# Patient Record
Sex: MALE | Race: WHITE
[De-identification: names, ages, dates, MRNs, and addresses within clinical notes are randomized per-mention and may not be internally consistent; named-entity substitution may affect disease eponyms.]

---

## 2020-09-08 ENCOUNTER — HOSPITAL ENCOUNTER (OUTPATIENT)
Dept: HOSPITAL 62 - ER | Age: 45
Setting detail: OBSERVATION
LOS: 1 days | Discharge: HOME | End: 2020-09-09
Attending: INTERNAL MEDICINE | Admitting: INTERNAL MEDICINE
Payer: SELF-PAY

## 2020-09-08 DIAGNOSIS — F17.200: ICD-10-CM

## 2020-09-08 DIAGNOSIS — Z86.018: ICD-10-CM

## 2020-09-08 DIAGNOSIS — R05: ICD-10-CM

## 2020-09-08 DIAGNOSIS — I10: ICD-10-CM

## 2020-09-08 DIAGNOSIS — E78.5: ICD-10-CM

## 2020-09-08 DIAGNOSIS — R00.0: ICD-10-CM

## 2020-09-08 DIAGNOSIS — R06.09: ICD-10-CM

## 2020-09-08 DIAGNOSIS — R00.2: ICD-10-CM

## 2020-09-08 DIAGNOSIS — R53.1: ICD-10-CM

## 2020-09-08 DIAGNOSIS — R07.89: Primary | ICD-10-CM

## 2020-09-08 DIAGNOSIS — R42: ICD-10-CM

## 2020-09-08 DIAGNOSIS — Z82.49: ICD-10-CM

## 2020-09-08 LAB
ADD MANUAL DIFF: NO
ALBUMIN SERPL-MCNC: 4.3 G/DL (ref 3.5–5)
ALP SERPL-CCNC: 67 U/L (ref 38–126)
ANION GAP SERPL CALC-SCNC: 7 MMOL/L (ref 5–19)
AST SERPL-CCNC: 51 U/L (ref 17–59)
BASOPHILS # BLD AUTO: 0.1 10^3/UL (ref 0–0.2)
BASOPHILS NFR BLD AUTO: 1 % (ref 0–2)
BILIRUB DIRECT SERPL-MCNC: 0.4 MG/DL (ref 0–0.4)
BILIRUB SERPL-MCNC: 0.6 MG/DL (ref 0.2–1.3)
BUN SERPL-MCNC: 9 MG/DL (ref 7–20)
CALCIUM: 9.6 MG/DL (ref 8.4–10.2)
CHLORIDE SERPL-SCNC: 105 MMOL/L (ref 98–107)
CK MB SERPL-MCNC: 0.74 NG/ML (ref ?–4.55)
CK SERPL-CCNC: 103 U/L (ref 55–170)
CO2 SERPL-SCNC: 25 MMOL/L (ref 22–30)
EOSINOPHIL # BLD AUTO: 0.1 10^3/UL (ref 0–0.6)
EOSINOPHIL NFR BLD AUTO: 1.3 % (ref 0–6)
ERYTHROCYTE [DISTWIDTH] IN BLOOD BY AUTOMATED COUNT: 14.1 % (ref 11.5–14)
GLUCOSE SERPL-MCNC: 141 MG/DL (ref 75–110)
HCT VFR BLD CALC: 47.9 % (ref 37.9–51)
HGB BLD-MCNC: 16.8 G/DL (ref 13.5–17)
LYMPHOCYTES # BLD AUTO: 1.7 10^3/UL (ref 0.5–4.7)
LYMPHOCYTES NFR BLD AUTO: 19.4 % (ref 13–45)
MCH RBC QN AUTO: 33.6 PG (ref 27–33.4)
MCHC RBC AUTO-ENTMCNC: 35 G/DL (ref 32–36)
MCV RBC AUTO: 96 FL (ref 80–97)
MONOCYTES # BLD AUTO: 0.5 10^3/UL (ref 0.1–1.4)
MONOCYTES NFR BLD AUTO: 5.7 % (ref 3–13)
NEUTROPHILS # BLD AUTO: 6.2 10^3/UL (ref 1.7–8.2)
NEUTS SEG NFR BLD AUTO: 72.6 % (ref 42–78)
PLATELET # BLD: 154 10^3/UL (ref 150–450)
POTASSIUM SERPL-SCNC: 4.5 MMOL/L (ref 3.6–5)
PROT SERPL-MCNC: 7.4 G/DL (ref 6.3–8.2)
RBC # BLD AUTO: 4.99 10^6/UL (ref 4.35–5.55)
TOTAL CELLS COUNTED % (AUTO): 100 %
TROPONIN I SERPL-MCNC: < 0.012 NG/ML
WBC # BLD AUTO: 8.5 10^3/UL (ref 4–10.5)

## 2020-09-08 PROCEDURE — 84481 FREE ASSAY (FT-3): CPT

## 2020-09-08 PROCEDURE — 93306 TTE W/DOPPLER COMPLETE: CPT

## 2020-09-08 PROCEDURE — 36415 COLL VENOUS BLD VENIPUNCTURE: CPT

## 2020-09-08 PROCEDURE — 84439 ASSAY OF FREE THYROXINE: CPT

## 2020-09-08 PROCEDURE — 93005 ELECTROCARDIOGRAM TRACING: CPT

## 2020-09-08 PROCEDURE — 85379 FIBRIN DEGRADATION QUANT: CPT

## 2020-09-08 PROCEDURE — 93010 ELECTROCARDIOGRAM REPORT: CPT

## 2020-09-08 PROCEDURE — 80053 COMPREHEN METABOLIC PANEL: CPT

## 2020-09-08 PROCEDURE — 82550 ASSAY OF CK (CPK): CPT

## 2020-09-08 PROCEDURE — 84443 ASSAY THYROID STIM HORMONE: CPT

## 2020-09-08 PROCEDURE — 99285 EMERGENCY DEPT VISIT HI MDM: CPT

## 2020-09-08 PROCEDURE — 83036 HEMOGLOBIN GLYCOSYLATED A1C: CPT

## 2020-09-08 PROCEDURE — 85025 COMPLETE CBC W/AUTO DIFF WBC: CPT

## 2020-09-08 PROCEDURE — 71045 X-RAY EXAM CHEST 1 VIEW: CPT

## 2020-09-08 PROCEDURE — 80061 LIPID PANEL: CPT

## 2020-09-08 PROCEDURE — G0378 HOSPITAL OBSERVATION PER HR: HCPCS

## 2020-09-08 PROCEDURE — 80048 BASIC METABOLIC PNL TOTAL CA: CPT

## 2020-09-08 PROCEDURE — 82553 CREATINE MB FRACTION: CPT

## 2020-09-08 PROCEDURE — 84484 ASSAY OF TROPONIN QUANT: CPT

## 2020-09-08 RX ADMIN — Medication SCH: at 21:52

## 2020-09-08 NOTE — RADIOLOGY REPORT (SQ)
EXAM DESCRIPTION:  CHEST SINGLE VIEW



IMAGES COMPLETED DATE/TIME:  9/8/2020 2:51 pm



REASON FOR STUDY:  cp



COMPARISON:  None.



EXAM PARAMETERS:  NUMBER OF VIEWS: One view.

TECHNIQUE: Single frontal radiographic view of the chest acquired.

RADIATION DOSE: NA

LIMITATIONS: None.



FINDINGS:  LUNGS AND PLEURA: No opacities, masses or pneumothorax. No pleural effusion.

MEDIASTINUM AND HILAR STRUCTURES: No masses.  Contour normal.

HEART AND VASCULAR STRUCTURES: Heart normal in size.  Normal vasculature.

BONES: No acute findings.

HARDWARE: None in the chest.

OTHER: No other significant finding.



IMPRESSION:  NO ACUTE RADIOGRAPHIC FINDING IN THE CHEST.



TECHNICAL DOCUMENTATION:  JOB ID:  [id number]

 2011 [employer]- All Rights Reserved



Reading location - IP/workstation name: [email or web address]

## 2020-09-08 NOTE — ER DOCUMENT REPORT
ED Medical Screen (RME)





- General


Stated Complaint: CHEST PAIN


Time Seen by Provider: 09/08/20 13:55


TRAVEL OUTSIDE OF THE [place] IN LAST 30 DAYS: No





- HPI


Notes: 





09/08/20 13:56


45 yr old male presents today with complaints of left-sided chest pain, kft 

sided chest tightness that started to radiate down his left arm while he was 

driving approximately an hour ago.  Patient states that he has had intermittent 

chest pain over the last few weeks but is never had it checked out and is 

ignored it.  His blood pressure when EMS arrived was systolically over the 200s,

was given 2 nitros which relieved his chest pain from a 5/5 to a 1/5.  Patient 

states that his father has had 2 heart attacks and his grandfather had 6 heart 

attacks, passing away on 6 1.  Patient is a smoker, states he had stage I lung 

cancer last year which was surgically resected.  Patient was given 324 baby 

aspirin and 2 nitro's by EMS.  Patient's blood pressure has now normalized to 

120s over 80s, heart rate is still in the 120s.  Denies any nausea vomiting 

diarrhea.  Denies any headache, blurred vision double vision or loss of vision, 

shortness of breath, numbness or tingling to face or bilateral upper or lower 

extremities.








I have greeted and performed a rapid initial assessment of this patient.  A 

comprehensive ED assessment and evaluation of the patient, analysis of test 

results and completion of the medical decision making process will be conducted 

by additional ED providers.





PHYSICAL EXAMINATION:





GENERAL: Well-appearing, well-nourished and in no acute distress.





HEAD: Atraumatic, normocephalic.





EYES: Pupils equal round extraocular movements intact,  conjunctiva are normal.





NECK: Normal range of motion





CV: s1, s2 regular 





LUNGS: No respiratory distress





Musculoskeletal: Normal range of motion





NEUROLOGICAL:  Normal speech, normal gait. 





SKIN: Warm, Dry, normal turgor, no rashes or lesions noted.





- Related Data


Allergies/Adverse Reactions: 


                                        





No Known Allergies Allergy (Verified 01/23/16 14:14)


   











Past Medical History


Renal/ Medical History: Reports: Hx Kidney Stones





- Immunizations


Hx Diphtheria, Pertussis, Tetanus Vaccination: Yes

## 2020-09-08 NOTE — ER DOCUMENT REPORT
ED General





- General


Chief Complaint: Chest Pain


Stated Complaint: CHEST PAIN


Time Seen by Provider: 09/08/20 13:55


Notes: 





45-year-old male history of smoking presents with chest pain.  Patient says that

over the last 4 months he has had exercise intolerance, shortness of breath with

doing activities that you normally is capable of, and intermittent chest pain 

that he describes as left-sided pressure-like nonradiating chest pain associated

with palpitations and shortness of breath that lasted for minutes to hours and 

then resolved without intervention.  Patient had similar episode today while he 

was driving and felt extremely weak and short of breath with rapid heartbeat and

called EMS.  Patient given nitroglycerin by EMS which improved his pain.  

Patient currently pain-free and asymptomatic in ED.  Patient had prior pulmonary

nodule removed which was negative for cancer, note it appears to be erroneous 

that says previous lung cancer in chart.  Patient does not follow-up regularly 

with a primary doctor so does not know which hypertension that he has today has 

been chronic.  Patient with significant CAD history in father's side of family 

with father and grandfather having multiple heart attacks at young ages below 

age of 55.  Patient had a stress test last year which she says was negative, has

not had any cardiac eval over the last several months when symptoms have been 

significantly worsened. Patient denies any known cardiac history, 

DVT/PE/hypercoag history in self or family, recent 

travel/trauma/surgery/immobilization, change in chronic nonproductive cough 

times many years, hemoptysis, fever, syncope, back pain, abdominal pain, trauma,

myalgia


TRAVEL OUTSIDE OF THE [place] IN LAST 30 DAYS: No





- Related Data


Allergies/Adverse Reactions: 


                                        





No Known Allergies Allergy (Verified 01/23/16 14:14)


   











Past Medical History





- General


Information source: Patient





- Social History


Smoking Status: Current Every Day Smoker


Chew tobacco use (# tins/day): No


Frequency of alcohol use: None


Drug Abuse: None


Family History: Reviewed & Not Pertinent, CAD


Renal/ Medical History: Reports: Hx Kidney Stones





- Immunizations


Hx Diphtheria, Pertussis, Tetanus Vaccination: Yes





Review of Systems





- Review of Systems


Notes: 





REVIEW OF SYSTEMS:


CONSTITUTIONAL :  Denies fever,  chills, or sweats. 


EENT: Denies recent cold/sinus symptoms, denies throat pain


CARDIOVASCULAR:  +chest pain, -LESVIA


RESPIRATORY:  Denies cough, +shortness of breath.


GASTROINTESTINAL:  Denies abdominal pain, nausea/vomiting.


GENITOURINARY:  Denies difficulty urinating, painful urination.


MUSCULOSKELETAL:  Denies neck pain, back pain.


SKIN:   Denies rash or skin lesions.


HEMATOLOGIC :   Denies easy bruising or bleeding.


LYMPHATIC:  Denies swollen, enlarged glands.


NEUROLOGICAL:  Denies headache, denies change in gait.


PSYCHIATRIC:  Denies anxiety or stress or depression.





Physical Exam





- Vital signs


Vitals: 


                                        











Temp Pulse Resp BP Pulse Ox


 


 98.2 F   101 H  20   124/91 H  96 


 


 09/08/20 14:04  09/08/20 14:04  09/08/20 14:04  09/08/20 14:04  09/08/20 14:04














- Notes


Notes: 





PHYSICAL EXAMINATION:


 


GENERAL: Well-appearing, well-nourished and in no acute distress.


 


HEAD: Atraumatic, normocephalic.


 


EYES: Pupils equal round and appropriate constriction, sclera anicteric, 

conjunctiva are normal.


 


ENT: nares patent, moist mucous membranes.


 


NECK: Normal range of motion, supple without lymphadenopathy, no JVD


 


LUNGS: Breath sounds clear to auscultation bilaterally and equal.  No wheezes 

rales or rhonchi.


 


HEART: Regular rate and rhythm without murmurs rubs or gallops, chest nontender


 


ABDOMEN: Soft, nontender, no guarding, no masses, no CVAT


 


EXTREMITIES: Normal range of motion, no pitting or edema.  No cyanosis. No calf 

tenderness.


 


NEUROLOGICAL: Awake, alert, conversing appropriately, moves all extremities 

spontaneously.


 


PSYCH: Normal mood, normal affect.


 


SKIN: Warm, Dry, normal turgor, no rashes or lesions noted.





Course





- Re-evaluation


Re-evalutation: 





09/08/20 17:58


Patient story concerning for unstable angina versus paroxysmal arrhythmia. EKG 

and initial troponin negative.  Given concerning story and smoking and likely 

undiagnosed/untreated hypertension as risk factors unable to discharge patient 

for outpatient cardiology follow-up as heart score is elevated at 4.  Given 

patient's initial tachycardia I obtained a d-dimer which is negative 

insufficient to rule out PE in this patient as he has no other risk factors and 

low pretest probability.  Patient currently chest pain-free.  Discussed with Dr. Rahman who says that patient may need diagnostic cath and that he will evaluate 

patient in the morning.  Discussed with Dr. Zaho who has admitted patient to 

observation.





- Vital Signs


Vital signs: 


                                        











Temp Pulse Resp BP Pulse Ox


 


 98.6 F   62   17   138/87 H  100 


 


 09/09/20 00:03  09/09/20 00:03  09/09/20 00:03  09/09/20 00:03  09/09/20 00:03














- Laboratory


Result Diagrams: 


                                 09/08/20 14:17





                                 09/08/20 14:17


Laboratory results interpreted by me: 


                                        











  09/08/20 09/08/20





  14:17 14:17


 


MCH  33.6 H 


 


RDW  14.1 H 


 


Glucose   141 H














- EKG Interpretation by Me


Additional EKG results interpreted by me: 





09/09/20 02:12


Heart rate 93, sinus rhythm, no significant ST elevations or depressions, no 

significant T wave abnormalities, QTc 433





Discharge





- Discharge


Clinical Impression: 


Chest pain


Qualifiers:


 Chest pain type: unspecified Qualified Code(s): R07.9 - Chest pain, unspecified





Disposition: ADMITTED AS OBSERVATION


Admitting Provider: Ana (Hospitalist)


Unit Admitted: Telemetry

## 2020-09-08 NOTE — PDOC H&P
History of Present Illness


Patient complains of: chest pain, sob, palpitations


History of Present Illness: 


CLEMENTINA YANCEY is a 45 year old male with no significant past medical history, 

presents to the hospital for evaluation of paroxysms of chest pain, palpitations

and dyspnea.  The symptoms sometimes occur together but sometimes okay 

independent of each other.  He had an episode today which prompted his 

appearance.  He describes chest pain as substernal, pressure-like, often with 

tingling radiating to his left arm, denies aggravating factor but improves with 

rest.  His episode today improved with nitroglycerin which was administered by 

the EMS.  He states he has been having the symptoms for the past several months 

now.  He also had the symptoms at some point last year at which time he was 

evaluated by [facility] with a treadmill stress test 

which he tells me was negative.  He has not seen a doctor in the past 1 year.  

Denies history of heart disease.  Does state that his father did have heart 

disease as well as his grandfather.  Currently chest pain is 1/10 but was bad as

8/10 earlier today.  Also discusses exertional dyspnea.








Past Medical History


Medical History: None





Past Surgical History


Past Surgical History: Reports: Other - Knee surgery





Social History


Smoking Status: Current Every Day Smoker


Electronic Cigarette use?: No


Frequency of Alcohol Use: Heavy


Hx Recreational Drug Use: No


Drugs: None





- Advance Directive


Resuscitation Status: Full Code





Family History


Family History: CAD - And his father and grandfather.  His father onset of CAD 

was in his 50s.


Parental Family History Reviewed: Yes


Children Family History Reviewed: NA


Sibling(s) Family History Reviewed.: Yes





Medication/Allergy


Home Medications: 








Doxycycline Hyclate 100 mg PO BID #20 capsule 01/23/16 


Fluticasone Propionate [Flonase Nasal Spray 50 Mcg/Spray 16 gm] 1 spray NASL 

DAILY #1 bottle 01/23/16 


Oxycodone HCl/Acetaminophen [Percocet 5-325 mg Tablet] 1 - 2 tab PO ASDIR PRN 

#15 tablet 01/23/16 


Promethazine HCl [Phenergan 25 mg Tablet] 25 mg PO Q6H PRN #15 tablet 01/23/16 








Allergies/Adverse Reactions: 


                                        





No Known Allergies Allergy (Verified 01/23/16 14:14)


   











Review of Systems


Constitutional: ABSENT: chills, fatigue, fever(s)


Eyes: ABSENT: visual disturbances


Ears: ABSENT: hearing changes


Nose, Mouth, and Throat: ABSENT: headache(s)


Cardiovascular: PRESENT: chest pain, dyspnea on exertion, palpitations.  ABSENT:

edema, orthropnea


Respiratory: PRESENT: cough, dyspnea.  ABSENT: hemoptysis, sputum


Gastrointestinal: ABSENT: abdominal pain, nausea, vomiting


Genitourinary: ABSENT: dysuria


Musculoskeletal: ABSENT: back pain


Integumentary: ABSENT: diaphoresis


Neurological: PRESENT: dizziness - Occurred earlier today during his palpitation

episode


Endocrine: ABSENT: polyuria


Hematologic/Lymphatic: ABSENT: easy bruising


Allergic/Immunologic: ABSENT: seasonal rhinorrhea





Physical Exam


Vital Signs: 


                                        











Temp Pulse Resp BP Pulse Ox


 


 98.2 F   101 H  24 H  135/95 H  96 


 


 09/08/20 14:04  09/08/20 14:04  09/08/20 16:31  09/08/20 16:31  09/08/20 16:31








                                 Intake & Output











 09/07/20 09/08/20 09/09/20





 06:59 06:59 06:59


 


Weight   100.244 kg











General appearance: PRESENT: no acute distress, cooperative


Head exam: PRESENT: normocephalic


Eye exam: PRESENT: EOMI


Neck exam: ABSENT: JVD


Respiratory exam: PRESENT: clear to auscultation aubrey, symmetrical, unlabored.  

ABSENT: stridor, tachypnea, wheezes


Cardiovascular exam: PRESENT: RRR, +S1, +S2.  ABSENT: diastolic murmur, systolic

murmur, tachycardia


GI/Abdominal exam: PRESENT: soft.  ABSENT: rebound, rigid, tenderness


Extremities exam: ABSENT: calf tenderness, pedal edema


Neurological exam: PRESENT: alert, awake, oriented to person, oriented to place,

oriented to time


Psychiatric exam: ABSENT: agitated, anxious


Focused psych exam: ABSENT: pressured speech


Skin exam: ABSENT: jaundice





Results


Laboratory Results: 


                                        





                                 09/08/20 14:17 





                                 09/08/20 14:17 





                                        











  09/08/20 09/08/20





  14:17 14:17


 


WBC  8.5 


 


RBC  4.99 


 


Hgb  16.8 


 


Hct  47.9 


 


MCV  96 


 


MCH  33.6 H 


 


MCHC  35.0 


 


RDW  14.1 H 


 


Plt Count  154 


 


Seg Neutrophils %  72.6 


 


Sodium   137.4


 


Potassium   4.5


 


Chloride   105


 


Carbon Dioxide   25


 


Anion Gap   7


 


BUN   9


 


Creatinine   0.96


 


Est GFR (African Amer)   > 60


 


Glucose   141 H


 


Calcium   9.6


 


Total Bilirubin   0.6


 


AST   51


 


Alkaline Phosphatase   67


 


Total Protein   7.4


 


Albumin   4.3








                                        











  09/08/20 09/08/20





  14:17 14:17


 


Creatine Kinase  103 


 


CK-MB (CK-2)   0.74


 


Troponin I   < 0.012











Impressions: 


                                        





Chest X-Ray  09/08/20 13:55


IMPRESSION:  NO ACUTE RADIOGRAPHIC FINDING IN THE CHEST.


 














Assessment and Plan





- Diagnosis


(1) Atypical chest pain


Is this a current diagnosis for this admission?: Yes   





(2) Palpitations


Is this a current diagnosis for this admission?: Yes   





(3) Dyspnea


Is this a current diagnosis for this admission?: Yes   





(4) Elevated blood pressure reading


Is this a current diagnosis for this admission?: Yes   





(5) Tobacco abuse


Is this a current diagnosis for this admission?: Yes   





- Plan Summary


Summary: 


Admit to observation on telemetry.


Chest pain is atypical but concerning given nature of chest pain and improvement

with rest and nitroglycerin.


He states he had negative stress test at [facility] in 2019


EKG reviewed


First troponin is negative.  I will trend x3.


Check echocardiogram to evaluate heart function especially in light of BRODY and 

palpitations


Uncertain if he is having paroxysms of arrhythmias-rhythm strip by EMS shows 

sinus tachycardia


We will keep on continuous cardiac monitoring


Cardiology will be consulted to see if need for stress test versus [facility]


D-dimer is negative ruling out PE or dissection


Sublingual nitro will be provided as needed


Counseled on smoking cessation


Check lipid panel hemoglobin A1c in the morning


Denies history of hypertension, will monitor BP and start on antihypertensive if

still persistent in the morning.





- Time


Time Spent with patient: 25-34 minutes


Anticipated Discharge Disposition: Home, Self Care


Anticipated Discharge Timeframe: within 48 hours

## 2020-09-09 VITALS — DIASTOLIC BLOOD PRESSURE: 86 MMHG | SYSTOLIC BLOOD PRESSURE: 134 MMHG

## 2020-09-09 LAB
ANION GAP SERPL CALC-SCNC: 9 MMOL/L (ref 5–19)
BUN SERPL-MCNC: 12 MG/DL (ref 7–20)
CALCIUM: 9.2 MG/DL (ref 8.4–10.2)
CHLORIDE SERPL-SCNC: 103 MMOL/L (ref 98–107)
CHOLEST SERPL-MCNC: 220.72 MG/DL (ref 0–200)
CO2 SERPL-SCNC: 24 MMOL/L (ref 22–30)
FREE T4 (FREE THYROXINE): 0.94 NG/DL (ref 0.78–2.19)
GLUCOSE SERPL-MCNC: 103 MG/DL (ref 75–110)
LDLC SERPL DIRECT ASSAY-MCNC: 163 MG/DL (ref ?–100)
POTASSIUM SERPL-SCNC: 4 MMOL/L (ref 3.6–5)
T3FREE SERPL-MCNC: 4.64 PG/ML (ref 2.77–5.27)
TRIGL SERPL-MCNC: 122 MG/DL (ref ?–150)
VLDLC SERPL CALC-MCNC: 24 MG/DL (ref 10–31)

## 2020-09-09 RX ADMIN — Medication SCH: at 05:25

## 2020-09-09 NOTE — XCELERA REPORT
[address] [address]

                               Tel: [phone]

                               Fax: [phone]



                      Transthoracic Echocardiogram Report

_______________________________________________________________________________



Name: CLEMENTINA YANCEY

MRN: [id number]                           Age: 45 yrs

Gender: Male                              : 1975

Patient Status: Inpatient                 Patient Location: [address] [place], [place] [address]

Account #: [id number]

Study Date: 2020 07:53 AM

History: Chest pain

Accession #: [id number]

_______________________________________________________________________________



Height: 71 in        Weight: 221 lb        BSA: 2.2 m2

_______________________________________________________________________________

Procedure: A complete two-dimensional transthoracic echocardiogram was

performed (2D, M-mode, spectral and color flow Doppler). The study was

technically difficult with many images being suboptimal in quality.

Reason For Study: chest pain, palpitations.

Previous Evaluation: No previous studies were available.

History: HTN. Chest pain.



Ordering Physician: CLIFFORD SALES

_______________________________________________________________________________



Interpretation Summary

Left ventricular systolic function is normal.

The Ejection Fraction estimate is 55-60%

The right ventricle is normal in size and function.

There is no aortic valve stenosis

There is a trace amount of tricuspid regurgitation

Doppler findings do not suggest pulmonary hypertension.

There is no pericardial effusion.



MMode/2D Measurements & Calculations

RVDd: 2.1 cm   LVIDd: 5.3 cm  FS: 33.7 %              Ao root diam: 2.4 cm



IVSd: 0.96 cm  LVIDs: 3.5 cm  EDV(Teich): 134.5 ml    Ao root area: 4.4 cm2

               LVPWd: 1.1 cm  ESV(Teich): 51.0 ml

                              EF(Teich): 62.1 %



Doppler Measurements & Calculations

MV E max kelsi:       MV dec slope:         Ao V2 max:        LV V1 max P.5 cm/sec         329.3 cm/sec2         98.1 cm/sec       3.0 mmHg

MV A max kelsi:       MV dec time: 0.18 sec Ao max PG:        LV V1 max:

56.2 cm/sec                               3.8 mmHg          86.6 cm/sec

MV E/A: 1.1

        _______________________________________________________________

PA V2 max:

63.8 cm/sec

PA max P.6 mmHg





Left Ventricle

The left ventricle is grossly normal size. There is mild to moderate

concentric left ventricular hypertrophy. Left ventricular systolic function is

normal. The Ejection Fraction estimate is 55-60%. Doppler measurements suggest

pseudonormalized left ventricular relaxation, which is associated with grade

II/IV or mild to moderate diastolic dysfunction. No regional wall motion

abnormalities noted.



Right Ventricle

The right ventricle is normal in size and function.



Atria

The right atrium is normal. The left atrium is borderline dilated. The

interatrial septum is intact with no evidence for an atrial septal defect.

There is no Doppler evidence for an interatrial shunt.



Mitral Valve

The mitral valve is grossly normal. There is no mitral valve stenosis. There

is no mitral regurgitation noted.





Aortic Valve

The aortic valve is not well visualized secondary to technical limitations.

The aortic valve opens well. There is no aortic valve stenosis. No aortic

regurgitation is present.



Tricuspid Valve

The tricuspid valve is normal in structure and function. There is no tricuspid

stenosis. There is a trace amount of tricuspid regurgitation. Tricuspid

regurgitation jet envelope not well defined to measure RV systolic pressure

accurately. Doppler findings do not suggest pulmonary hypertension.



Pulmonic Valve

The pulmonic valve is not well visualized. There is no pulmonic valvular

stenosis. There is a trace amount of pulmonic regurgitation.



Great Vessels

The aortic root is normal size. The inferior vena cava appeared normal and

decreased > 50% with respiration (RAP 5-10 mmHg).





Effusions

There is no pericardial effusion.



_______________________________________________________________________________

_______________________________________________________________________________



Electronically signed by:      Rich Rahman      on 2020 11:15 AM



CC: CLIFFORD SALES Anil

## 2020-09-09 NOTE — PDOC DISCHARGE SUMMARY
Impression





- Admit/DC Date/PCP


Admission Date/Primary Care Provider: 


  09/08/20 18:27





  





Discharge Date: 09/09/20





- Discharge Diagnosis


(1) Atypical chest pain


Is this a current diagnosis for this admission?: Yes   





(2) Palpitations


Is this a current diagnosis for this admission?: Yes   





(3) Dyspnea


Is this a current diagnosis for this admission?: Yes   





(4) Tobacco abuse


Is this a current diagnosis for this admission?: Yes   





(5) Hypertension


Is this a current diagnosis for this admission?: Yes   





(6) Hyperlipidemia


Is this a current diagnosis for this admission?: Yes   





- Assessment


Summary: 


Admit to observation on telemetry.


Chest pain is atypical but concerning given nature of chest pain and improvement

with rest and nitroglycerin.


He states he had negative stress test at [facility] in 2019


EKG reviewed


First troponin is negative.  I will trend x3.


Check echocardiogram to evaluate heart function especially in light of BRODY and 

palpitations


Uncertain if he is having paroxysms of arrhythmias-rhythm strip by EMS shows 

sinus tachycardia


We will keep on continuous cardiac monitoring


Cardiology will be consulted to see if need for stress test versus [facility]


D-dimer is negative ruling out PE or dissection


Sublingual nitro will be provided as needed


Counseled on smoking cessation


Check lipid panel hemoglobin A1c in the morning


Denies history of hypertension, will monitor BP and start on antihypertensive if

still persistent in the morning.





- Additional Information


Resuscitation Status: Full Code


Discharge Diet: Cardiac


Discharge Activity: Activity As Tolerated


Referrals: 


[facility] [NO LOCAL MD] - 


LAVONNE MAN MD [ACTIVE STAFF] - 09/14/20


Prescriptions: 


Losartan Potassium [Cozaar 50 mg Tablet] 50 mg PO DAILY #30 tablet


Atorvastatin Calcium [Lipitor 40 mg Tablet] 40 mg PO QHS #30 tablet


Nitroglycerin [Nitrostat 0.4 mg (1/150 Gr) Tabs 25/Bottle] 1 tab SL Q5MP PRN #1 

bottle


 PRN Reason: 


Home Medications: 








Atorvastatin Calcium [Lipitor 40 mg Tablet] 40 mg PO QHS #30 tablet 09/09/20 


Losartan Potassium [Cozaar 50 mg Tablet] 50 mg PO DAILY #30 tablet 09/09/20 


Nitroglycerin [Nitrostat 0.4 mg (1/150 Gr) Tabs 25/Bottle] 1 tab SL Q5MP PRN #1 

bottle 09/09/20 











History of Present Illiness


History of Present Illness: 


CLEMENTINA YANCEY is a 45 year old male with no significant past medical history, 

presents to the hospital for evaluation of paroxysms of chest pain, palpitations

and dyspnea.  The symptoms sometimes occur together but sometimes okay 

independent of each other.  He had an episode today which prompted his 

appearance.  He describes chest pain as substernal, pressure-like, often with 

tingling radiating to his left arm, denies aggravating factor but improves with 

rest.  His episode today improved with nitroglycerin which was administered by 

the EMS.  He states he has been having the symptoms for the past several months 

now.  He also had the symptoms at some point last year at which time he was 

evaluated by [facility] with a treadmill stress test 

which he tells me was negative.  He has not seen a doctor in the past 1 year.  

Denies history of heart disease.  Does state that his father did have heart 

disease as well as his grandfather.  Currently chest pain is 1/10 but was bad as

8/10 earlier today.  Also discusses exertional dyspnea.








Hospital Course


Hospital Course: 


Patient presented with chest pain, palpitations as well as dyspnea.  His chest 

pain was atypical but has risk factors.  Of note, he had a treadmill stress 

testing at [facility] last year which she reports was 

negative.  On presentation, his records from EMS was reviewed which noted sinus 

tachycardia.  Labs were unremarkable.  Troponin was negative x3.  EKG showed no 

ischemic changes.  Admitted observation.  He reported response to nitroglycerin 

tablets which were administered by EMS and states his chest pain improved from 

8/10 to 3/10.  Currently chest pain is resolved.  Was placed on cardiac 

monitoring throughout hospital stay and was reviewed this morning which was 

negative for any arrhythmias.  D-dimer obtained was negative essentially ruling 

out PE or dissection.  Echocardiogram was obtained this morning.  Have discussed

the results with Dr. Lavonne Man over the phone and he informs me that the echo 

was essentially unremarkable without any evidence of wall motion abnormalities 

or abnormal heart function Or valvular disease but did show some LVH.  He was 

noted to be hypertensive in the hospital so I have started him on losartan.  

Lipid panel indicated above hyperlipidemia and have started him on atorvastatin.

 TSH was abnormal the high but T4 and T3 were normal.  He has been instructed to

repeat TFTs in 4 to 6 weeks.  I will discuss case elaborately with Dr. Lavonne Man on the phone who will make an appointment for patient to see him in the 

office next week Monday to set up left heart catheterization to be done as 

outpatient for definitive testing for coronary artery disease.  Patient is eager

to go home today and does not want to wait in the hospital past today.  He has 

been discharged with medication prescriptions sent to his pharmacy and 

instructions to set up with a primary care provider and to follow-up with Dr. Man in the office.





Physical Exam


Vital Signs: 


                                        











Temp Pulse Resp BP Pulse Ox


 


 98.6 F   57 L  17   138/87 H  100 


 


 09/09/20 08:44  09/09/20 07:00  09/09/20 00:03  09/09/20 00:03  09/09/20 00:03








                                 Intake & Output











 09/08/20 09/09/20 09/10/20





 06:59 06:59 06:59


 


Intake Total  100 


 


Balance  100 


 


Weight  95.5 kg 











General appearance: PRESENT: no acute distress, cooperative


Neck exam: ABSENT: JVD


Respiratory exam: PRESENT: clear to auscultation aubrey, symmetrical, unlabored.  

ABSENT: tachypnea


Cardiovascular exam: PRESENT: RRR, +S1, +S2.  ABSENT: diastolic murmur, systolic

murmur, tachycardia


Neurological exam: PRESENT: alert, awake, oriented to person, oriented to place,

oriented to time





Results


Laboratory Results: 


                                        











WBC  8.5 10^3/uL (4.0-10.5)   09/08/20  14:17    


 


RBC  4.99 10^6/uL (4.35-5.55)   09/08/20  14:17    


 


Hgb  16.8 g/dL (13.5-17.0)   09/08/20  14:17    


 


Hct  47.9 % (37.9-51.0)   09/08/20  14:17    


 


MCV  96 fl (80-97)   09/08/20  14:17    


 


MCH  33.6 pg (27.0-33.4)  H  09/08/20  14:17    


 


MCHC  35.0 g/dL (32.0-36.0)   09/08/20  14:17    


 


RDW  14.1 % (11.5-14.0)  H  09/08/20  14:17    


 


Plt Count  154 10^3/uL (150-450)   09/08/20  14:17    


 


Lymph % (Auto)  19.4 % (13-45)   09/08/20  14:17    


 


Mono % (Auto)  5.7 % (3-13)   09/08/20  14:17    


 


Eos % (Auto)  1.3 % (0-6)   09/08/20  14:17    


 


Baso % (Auto)  1.0 % (0-2)   09/08/20  14:17    


 


Absolute Neuts (auto)  6.2 10^3/uL (1.7-8.2)   09/08/20  14:17    


 


Absolute Lymphs (auto)  1.7 10^3/uL (0.5-4.7)   09/08/20  14:17    


 


Absolute Monos (auto)  0.5 10^3/uL (0.1-1.4)   09/08/20  14:17    


 


Absolute Eos (auto)  0.1 10^3/uL (0.0-0.6)   09/08/20  14:17    


 


Absolute Basos (auto)  0.1 10^3/uL (0.0-0.2)   09/08/20  14:17    


 


Seg Neutrophils %  72.6 % (42-78)   09/08/20  14:17    


 


D-Dimer  < 0.27 ug/mL (0.00-0.50)   09/08/20  14:17    


 


Sodium  135.6 mmol/L (137-145)  L  09/09/20  04:54    


 


Potassium  4.0 mmol/L (3.6-5.0)   09/09/20  04:54    


 


Chloride  103 mmol/L ()   09/09/20  04:54    


 


Carbon Dioxide  24 mmol/L (22-30)   09/09/20  04:54    


 


Anion Gap  9  (5-19)   09/09/20  04:54    


 


BUN  12 mg/dL (7-20)   09/09/20  04:54    


 


Creatinine  1.06 mg/dL (0.52-1.25)   09/09/20  04:54    


 


Est GFR ( Amer)  > 60  (>60)   09/09/20  04:54    


 


Est GFR (MDRD) Non-Af  > 60  (>60)   09/09/20  04:54    


 


Glucose  103 mg/dL ()   09/09/20  04:54    


 


Hemoglobin A1c %  5.6 % (4.7-6.0)   09/09/20  04:54    


 


Calcium  9.2 mg/dL (8.4-10.2)   09/09/20  04:54    


 


Total Bilirubin  0.6 mg/dL (0.2-1.3)   09/08/20  14:17    


 


Direct Bilirubin  0.4 mg/dL (0.0-0.4)   09/08/20  14:17    


 


Neonat Total Bilirubin  Not Reportable   09/08/20  14:17    


 


Neonat Direct Bilirubin  Not Reportable   09/08/20  14:17    


 


Neonat Indirect Bili  Not Reportable   09/08/20  14:17    


 


AST  51 U/L (17-59)   09/08/20  14:17    


 


ALT  41 U/L (<50)   09/08/20  14:17    


 


Alkaline Phosphatase  67 U/L ()   09/08/20  14:17    


 


Creatine Kinase  103 U/L ()   09/08/20  14:17    


 


CK-MB (CK-2)  0.74 ng/mL (<4.55)   09/08/20  14:17    


 


Troponin I  < 0.012 ng/mL  09/08/20  22:10    


 


Total Protein  7.4 g/dL (6.3-8.2)   09/08/20  14:17    


 


Albumin  4.3 g/dL (3.5-5.0)   09/08/20  14:17    


 


Triglycerides  122 mg/dL (<150)   09/09/20  04:54    


 


Cholesterol  220.72 mg/dL (0-200)  H  09/09/20  04:54    


 


LDL Cholesterol Direct  163 mg/dL (<100)  H  09/09/20  04:54    


 


VLDL Cholesterol  24.0 mg/dL (10-31)   09/09/20  04:54    


 


HDL Cholesterol  51 mg/dL (>40)   09/09/20  04:54    


 


TSH  7.46 uIU/mL (0.47-4.68)  H  09/09/20  04:54    


 


Free T4  0.94 ng/dL (0.78-2.19)   09/09/20  04:54    


 


Free T3 pg/mL  4.64 pg/mL (2.77-5.27)   09/09/20  04:54    








                                        











  09/08/20 09/08/20 09/08/20





  14:17 18:30 22:10


 


CK-MB (CK-2)  0.74  


 


Troponin I  < 0.012  < 0.012  < 0.012











Impressions: 


                                        





Chest X-Ray  09/08/20 13:55


IMPRESSION:  NO ACUTE RADIOGRAPHIC FINDING IN THE CHEST.


 














Plan


Time Spent: Less than 30 Minutes





Stroke


Is this a Stroke Patient?: No





Acute Heart Failure





- **


Is this a Heart Failure Patient?: No